# Patient Record
Sex: MALE | Race: ASIAN | NOT HISPANIC OR LATINO | URBAN - METROPOLITAN AREA
[De-identification: names, ages, dates, MRNs, and addresses within clinical notes are randomized per-mention and may not be internally consistent; named-entity substitution may affect disease eponyms.]

---

## 2017-06-22 ENCOUNTER — INPATIENT (INPATIENT)
Facility: HOSPITAL | Age: 77
LOS: 2 days | Discharge: HOME | DRG: 663 | End: 2017-06-25
Attending: HOSPITALIST | Admitting: HOSPITALIST
Payer: MEDICARE

## 2017-06-22 VITALS
RESPIRATION RATE: 20 BRPM | OXYGEN SATURATION: 96 % | TEMPERATURE: 98 F | DIASTOLIC BLOOD PRESSURE: 123 MMHG | SYSTOLIC BLOOD PRESSURE: 188 MMHG | HEART RATE: 137 BPM

## 2017-06-22 DIAGNOSIS — Z98.52 VASECTOMY STATUS: Chronic | ICD-10-CM

## 2017-06-22 LAB
ALBUMIN SERPL ELPH-MCNC: 3.6 G/DL — SIGNIFICANT CHANGE UP (ref 3.3–5)
ALP SERPL-CCNC: 114 U/L — SIGNIFICANT CHANGE UP (ref 40–120)
ALT FLD-CCNC: 22 U/L — SIGNIFICANT CHANGE UP (ref 12–78)
ANION GAP SERPL CALC-SCNC: 10 MMOL/L — SIGNIFICANT CHANGE UP (ref 5–17)
APPEARANCE UR: ABNORMAL
APTT BLD: 30.1 SEC — SIGNIFICANT CHANGE UP (ref 27.5–37.4)
AST SERPL-CCNC: 24 U/L — SIGNIFICANT CHANGE UP (ref 15–37)
BASOPHILS # BLD AUTO: 0.1 K/UL — SIGNIFICANT CHANGE UP (ref 0–0.2)
BASOPHILS NFR BLD AUTO: 1.2 % — SIGNIFICANT CHANGE UP (ref 0–2)
BILIRUB SERPL-MCNC: 0.4 MG/DL — SIGNIFICANT CHANGE UP (ref 0.2–1.2)
BILIRUB UR-MCNC: NEGATIVE — SIGNIFICANT CHANGE UP
BUN SERPL-MCNC: 33 MG/DL — HIGH (ref 7–23)
CALCIUM SERPL-MCNC: 8.5 MG/DL — SIGNIFICANT CHANGE UP (ref 8.5–10.1)
CHLORIDE SERPL-SCNC: 108 MMOL/L — SIGNIFICANT CHANGE UP (ref 96–108)
CO2 SERPL-SCNC: 23 MMOL/L — SIGNIFICANT CHANGE UP (ref 22–31)
COLOR SPEC: ABNORMAL
CREAT SERPL-MCNC: 1.1 MG/DL — SIGNIFICANT CHANGE UP (ref 0.5–1.3)
DIFF PNL FLD: ABNORMAL
EOSINOPHIL # BLD AUTO: 0.2 K/UL — SIGNIFICANT CHANGE UP (ref 0–0.5)
EOSINOPHIL NFR BLD AUTO: 2.6 % — SIGNIFICANT CHANGE UP (ref 0–6)
GLUCOSE SERPL-MCNC: 190 MG/DL — HIGH (ref 70–99)
GLUCOSE UR QL: NEGATIVE — SIGNIFICANT CHANGE UP
HCT VFR BLD CALC: 44.3 % — SIGNIFICANT CHANGE UP (ref 39–50)
HGB BLD-MCNC: 15.7 G/DL — SIGNIFICANT CHANGE UP (ref 13–17)
INR BLD: 0.92 RATIO — SIGNIFICANT CHANGE UP (ref 0.88–1.16)
KETONES UR-MCNC: ABNORMAL
LEUKOCYTE ESTERASE UR-ACNC: NEGATIVE — SIGNIFICANT CHANGE UP
LYMPHOCYTES # BLD AUTO: 1.9 K/UL — SIGNIFICANT CHANGE UP (ref 1–3.3)
LYMPHOCYTES # BLD AUTO: 21.3 % — SIGNIFICANT CHANGE UP (ref 13–44)
MCHC RBC-ENTMCNC: 33 PG — SIGNIFICANT CHANGE UP (ref 27–34)
MCHC RBC-ENTMCNC: 35.3 GM/DL — SIGNIFICANT CHANGE UP (ref 32–36)
MCV RBC AUTO: 93.3 FL — SIGNIFICANT CHANGE UP (ref 80–100)
MONOCYTES # BLD AUTO: 0.5 K/UL — SIGNIFICANT CHANGE UP (ref 0–0.9)
MONOCYTES NFR BLD AUTO: 6.3 % — SIGNIFICANT CHANGE UP (ref 1–9)
NEUTROPHILS # BLD AUTO: 6 K/UL — SIGNIFICANT CHANGE UP (ref 1.8–7.4)
NEUTROPHILS NFR BLD AUTO: 68.6 % — SIGNIFICANT CHANGE UP (ref 43–77)
NITRITE UR-MCNC: NEGATIVE — SIGNIFICANT CHANGE UP
PH UR: 7 — SIGNIFICANT CHANGE UP (ref 5–8)
PLATELET # BLD AUTO: 314 K/UL — SIGNIFICANT CHANGE UP (ref 150–400)
POTASSIUM SERPL-MCNC: 3.9 MMOL/L — SIGNIFICANT CHANGE UP (ref 3.5–5.3)
POTASSIUM SERPL-SCNC: 3.9 MMOL/L — SIGNIFICANT CHANGE UP (ref 3.5–5.3)
PROT SERPL-MCNC: 7 G/DL — SIGNIFICANT CHANGE UP (ref 6–8.3)
PROT UR-MCNC: 500 MG/DL
PROTHROM AB SERPL-ACNC: 10 SEC — SIGNIFICANT CHANGE UP (ref 9.8–12.7)
RBC # BLD: 4.75 M/UL — SIGNIFICANT CHANGE UP (ref 4.2–5.8)
RBC # FLD: 11.2 % — SIGNIFICANT CHANGE UP (ref 10.3–14.5)
SODIUM SERPL-SCNC: 141 MMOL/L — SIGNIFICANT CHANGE UP (ref 135–145)
SP GR SPEC: 1.01 — SIGNIFICANT CHANGE UP (ref 1.01–1.02)
UROBILINOGEN FLD QL: NEGATIVE — SIGNIFICANT CHANGE UP
WBC # BLD: 8.7 K/UL — SIGNIFICANT CHANGE UP (ref 3.8–10.5)
WBC # FLD AUTO: 8.7 K/UL — SIGNIFICANT CHANGE UP (ref 3.8–10.5)

## 2017-06-22 RX ORDER — SODIUM CHLORIDE 9 MG/ML
1000 INJECTION INTRAMUSCULAR; INTRAVENOUS; SUBCUTANEOUS ONCE
Qty: 0 | Refills: 0 | Status: COMPLETED | OUTPATIENT
Start: 2017-06-22 | End: 2017-06-22

## 2017-06-22 NOTE — ED PROVIDER NOTE - OBJECTIVE STATEMENT
77 yo male s/p vasectomy on 6/5 (patient from [place]) BIB family c/o urinary retention since 3pm, noticed some clots passing.  No fever/chills, +suprapubic fullness.

## 2017-06-22 NOTE — ED ADULT NURSE NOTE - OBJECTIVE STATEMENT
received pt in bed #17a Pt A&O unable to urinate since this afternoon & urinating blood. Erickson placed without difficulty draining blood (350cc) received pt in bed #17a Pt A&O unable to urinate since this afternoon & urinating blood. Erickson placed without difficulty draining blood (350cc) Dr Chance @ bedside Will monitor

## 2017-06-22 NOTE — ED PROVIDER NOTE - CARE PLAN
Principal Discharge DX:	Hematuria Principal Discharge DX:	Hematuria  Secondary Diagnosis:	Urinary retention

## 2017-06-22 NOTE — ED ADULT TRIAGE NOTE - CHIEF COMPLAINT QUOTE
"He can't pee."  pt had vasectomy earlier this month, has had intermittent episodes of blood in urine, today complete blood and pt has not been able to urinate since this afternoon, pt in severe distress, diaphoretic, brought immediately to room 17A

## 2017-06-23 DIAGNOSIS — R73.9 HYPERGLYCEMIA, UNSPECIFIED: ICD-10-CM

## 2017-06-23 DIAGNOSIS — R33.9 RETENTION OF URINE, UNSPECIFIED: ICD-10-CM

## 2017-06-23 DIAGNOSIS — R31.9 HEMATURIA, UNSPECIFIED: ICD-10-CM

## 2017-06-23 DIAGNOSIS — Z29.9 ENCOUNTER FOR PROPHYLACTIC MEASURES, UNSPECIFIED: ICD-10-CM

## 2017-06-23 LAB
ANION GAP SERPL CALC-SCNC: 9 MMOL/L — SIGNIFICANT CHANGE UP (ref 5–17)
BUN SERPL-MCNC: 31 MG/DL — HIGH (ref 7–23)
CALCIUM SERPL-MCNC: 8.4 MG/DL — LOW (ref 8.5–10.1)
CHLORIDE SERPL-SCNC: 110 MMOL/L — HIGH (ref 96–108)
CO2 SERPL-SCNC: 23 MMOL/L — SIGNIFICANT CHANGE UP (ref 22–31)
CREAT SERPL-MCNC: 0.97 MG/DL — SIGNIFICANT CHANGE UP (ref 0.5–1.3)
GLUCOSE SERPL-MCNC: 133 MG/DL — HIGH (ref 70–99)
HBA1C BLD-MCNC: 5.7 % — HIGH (ref 4–5.6)
HCT VFR BLD CALC: 41 % — SIGNIFICANT CHANGE UP (ref 39–50)
HGB BLD-MCNC: 14.5 G/DL — SIGNIFICANT CHANGE UP (ref 13–17)
LYMPHOCYTES # BLD AUTO: 9 % — LOW (ref 13–44)
MCHC RBC-ENTMCNC: 33 PG — SIGNIFICANT CHANGE UP (ref 27–34)
MCHC RBC-ENTMCNC: 35.3 GM/DL — SIGNIFICANT CHANGE UP (ref 32–36)
MCV RBC AUTO: 93.4 FL — SIGNIFICANT CHANGE UP (ref 80–100)
MONOCYTES NFR BLD AUTO: 5 % — SIGNIFICANT CHANGE UP (ref 1–9)
NEUTROPHILS NFR BLD AUTO: 83 % — HIGH (ref 43–77)
PLATELET # BLD AUTO: 299 K/UL — SIGNIFICANT CHANGE UP (ref 150–400)
POTASSIUM SERPL-MCNC: 4 MMOL/L — SIGNIFICANT CHANGE UP (ref 3.5–5.3)
POTASSIUM SERPL-SCNC: 4 MMOL/L — SIGNIFICANT CHANGE UP (ref 3.5–5.3)
RBC # BLD: 4.39 M/UL — SIGNIFICANT CHANGE UP (ref 4.2–5.8)
RBC # FLD: 11.4 % — SIGNIFICANT CHANGE UP (ref 10.3–14.5)
SODIUM SERPL-SCNC: 142 MMOL/L — SIGNIFICANT CHANGE UP (ref 135–145)
WBC # BLD: 14.6 K/UL — HIGH (ref 3.8–10.5)
WBC # FLD AUTO: 14.6 K/UL — HIGH (ref 3.8–10.5)

## 2017-06-23 PROCEDURE — 93010 ELECTROCARDIOGRAM REPORT: CPT

## 2017-06-23 PROCEDURE — 99285 EMERGENCY DEPT VISIT HI MDM: CPT

## 2017-06-23 PROCEDURE — 86077 PHYS BLOOD BANK SERV XMATCH: CPT

## 2017-06-23 PROCEDURE — 74176 CT ABD & PELVIS W/O CONTRAST: CPT | Mod: 26

## 2017-06-23 PROCEDURE — 99223 1ST HOSP IP/OBS HIGH 75: CPT | Mod: AI,GC

## 2017-06-23 PROCEDURE — 12345: CPT | Mod: GC,NC

## 2017-06-23 PROCEDURE — 71010: CPT | Mod: 26

## 2017-06-23 RX ORDER — CEFTRIAXONE 500 MG/1
INJECTION, POWDER, FOR SOLUTION INTRAMUSCULAR; INTRAVENOUS
Qty: 0 | Refills: 0 | Status: DISCONTINUED | OUTPATIENT
Start: 2017-06-23 | End: 2017-06-23

## 2017-06-23 RX ORDER — OXYCODONE HYDROCHLORIDE 5 MG/1
5 TABLET ORAL EVERY 4 HOURS
Qty: 0 | Refills: 0 | Status: DISCONTINUED | OUTPATIENT
Start: 2017-06-23 | End: 2017-06-23

## 2017-06-23 RX ORDER — CEFTRIAXONE 500 MG/1
1 INJECTION, POWDER, FOR SOLUTION INTRAMUSCULAR; INTRAVENOUS ONCE
Qty: 0 | Refills: 0 | Status: COMPLETED | OUTPATIENT
Start: 2017-06-23 | End: 2017-06-23

## 2017-06-23 RX ORDER — ACETAMINOPHEN 500 MG
650 TABLET ORAL EVERY 6 HOURS
Qty: 0 | Refills: 0 | Status: DISCONTINUED | OUTPATIENT
Start: 2017-06-23 | End: 2017-06-25

## 2017-06-23 RX ORDER — CEFTRIAXONE 500 MG/1
1 INJECTION, POWDER, FOR SOLUTION INTRAMUSCULAR; INTRAVENOUS EVERY 24 HOURS
Qty: 0 | Refills: 0 | Status: CANCELLED | OUTPATIENT
Start: 2017-06-24 | End: 2017-06-23

## 2017-06-23 RX ORDER — ACETAMINOPHEN 500 MG
650 TABLET ORAL EVERY 6 HOURS
Qty: 0 | Refills: 0 | Status: DISCONTINUED | OUTPATIENT
Start: 2017-06-23 | End: 2017-06-23

## 2017-06-23 RX ORDER — ONDANSETRON 8 MG/1
4 TABLET, FILM COATED ORAL ONCE
Qty: 0 | Refills: 0 | Status: DISCONTINUED | OUTPATIENT
Start: 2017-06-23 | End: 2017-06-23

## 2017-06-23 RX ORDER — DOCUSATE SODIUM 100 MG
100 CAPSULE ORAL
Qty: 0 | Refills: 0 | Status: DISCONTINUED | OUTPATIENT
Start: 2017-06-23 | End: 2017-06-23

## 2017-06-23 RX ORDER — CEFTRIAXONE 500 MG/1
1 INJECTION, POWDER, FOR SOLUTION INTRAMUSCULAR; INTRAVENOUS EVERY 24 HOURS
Qty: 0 | Refills: 0 | Status: DISCONTINUED | OUTPATIENT
Start: 2017-06-23 | End: 2017-06-25

## 2017-06-23 RX ORDER — SODIUM CHLORIDE 9 MG/ML
1000 INJECTION, SOLUTION INTRAVENOUS
Qty: 0 | Refills: 0 | Status: DISCONTINUED | OUTPATIENT
Start: 2017-06-23 | End: 2017-06-23

## 2017-06-23 RX ORDER — HYDROMORPHONE HYDROCHLORIDE 2 MG/ML
0.5 INJECTION INTRAMUSCULAR; INTRAVENOUS; SUBCUTANEOUS
Qty: 0 | Refills: 0 | Status: DISCONTINUED | OUTPATIENT
Start: 2017-06-23 | End: 2017-06-23

## 2017-06-23 RX ADMIN — Medication 650 MILLIGRAM(S): at 21:52

## 2017-06-23 RX ADMIN — Medication 650 MILLIGRAM(S): at 22:41

## 2017-06-23 RX ADMIN — SODIUM CHLORIDE 1000 MILLILITER(S): 9 INJECTION INTRAMUSCULAR; INTRAVENOUS; SUBCUTANEOUS at 03:15

## 2017-06-23 RX ADMIN — Medication 650 MILLIGRAM(S): at 05:17

## 2017-06-23 RX ADMIN — Medication 650 MILLIGRAM(S): at 06:20

## 2017-06-23 RX ADMIN — CEFTRIAXONE 100 GRAM(S): 500 INJECTION, POWDER, FOR SOLUTION INTRAMUSCULAR; INTRAVENOUS at 07:47

## 2017-06-23 RX ADMIN — Medication 1 TABLET(S): at 13:58

## 2017-06-23 RX ADMIN — SODIUM CHLORIDE 100 MILLILITER(S): 9 INJECTION, SOLUTION INTRAVENOUS at 10:37

## 2017-06-23 NOTE — H&P ADULT - HISTORY OF PRESENT ILLNESS
75 y/o M PMHX recurrent UTI s/p repeat vasectomy (6/5/2017) presents with urinary retention and blood clots x 1 day. Patient states he passed 1 large blood clot yesterday     Admits to chills and sweating.     In the ED, Vitals 77 y/o M PMHX recurrent UTI s/p repeat vasectomy (6/5/2017) presents with urinary retention and blood clots x 1 day. Patient Mandarin speaking. Daughter at bedside to translate. Patient states he passed 1 large blood clot yesterday and around 3pm today started experiencing abdominal pain with distention and unable to urinate. Admits to chills and sweating. Denies fever, SOB, CP, N/V/D, dysuria, syncope, HA, back pain. Patient currently visiting from [place]. Vasectomy was performed to provide UTI ppx.     In the ED, Vitals /123, , T 98.2, sat 96%, RR 20, Repeat /96, HR 92. CBC WNL, BUN 33, Creatinine 1.10, Glucose 190, UA red turbid, trace ketones, protein 500, mode blood, RBC > 500, few bacteria. Erickson catheter placed. Urology called and aware. CT renal stone showed Erickson catheter balloon low-lying within the prostate urethra; recommend advancement or replacement. Large heterogeneous hyperdense clot in the posterior dependent of the bladder. Bladder is distended. No hydroureteronephrosis, obstructing urinary tract calculus, and or nephrolithiasis. Received 1 NS bolus.

## 2017-06-23 NOTE — DISCHARGE NOTE ADULT - MEDICATION SUMMARY - MEDICATIONS TO TAKE
I will START or STAY ON the medications listed below when I get home from the hospital:    Calcium 600+D oral tablet  -- 1 tab(s) by mouth once a day  -- Indication: For supplementation

## 2017-06-23 NOTE — H&P ADULT - PROBLEM SELECTOR PLAN 1
Admit to [facility]  Urology called in the ED - will see patient in am Admit to [facility]  Urology Dr. Walker called in the ED - will see patient in am  F/U urine cx   ren placed  F/U ekg and CXR Admit to [facility]  Urology Dr. Walker called in the ED - will see patient in am  F/U urine cx   ren placed, CBI   F/U ekg and CXR

## 2017-06-23 NOTE — DISCHARGE NOTE ADULT - NS AS ACTIVITY OBS
Driving allowed/Walking-Indoors allowed/Showering allowed/Walking-Outdoors allowed/Bathing allowed/Stairs allowed

## 2017-06-23 NOTE — PROGRESS NOTE ADULT - ASSESSMENT
77 y/o M PMHX recurrent UTI s/p repeat vasectomy (6/5/2017) admitted with urinary retention and hematuria. Plan for cystoscopy ariella VILLATORO. 77 y/o M PMHX recurrent UTI s/p repeat vasectomy (6/5/2017) admitted with urinary retention and hematuria. Plan for cystoscopy today. 77 y/o M PMHX recurrent UTI admitted with urinary retention and hematuria. s/p cystoscopy and evacuation of clots and fulgaration of proatate fossa.

## 2017-06-23 NOTE — DISCHARGE NOTE ADULT - CARE PROVIDER_API CALL
Chase Walker), Urology Surgery  [address] [address]  Phone: [phone]  Fax: [phone]

## 2017-06-23 NOTE — H&P ADULT - PROBLEM SELECTOR PLAN 3
DVT PPX - SCDs  fall risk, ambulate with assistance, OOB with assistance   DIET Dash TLC F/U HbA1C  Diet Dash/TLC

## 2017-06-23 NOTE — DISCHARGE NOTE ADULT - HOSPITAL COURSE
Patient is a 75yo M with history of recurrent UTI presents to ED for hematuria and abd pain admitted to [facility] for urinary retention and hematuria. Erickson was placed and drained bloody urine with clots. CT stone found large hypodense clot but no hydroureteronephrosis, obstructing urinary tract calculus, and or nephrolithiasis. Uro consulted. Pt taken to OR for cystoscopy with fulguration of prostate fossa. Bleeding improved s/p procedure. H/H stable. Pt medically optimized to be discharged home. Patient is a 75yo M with history of recurrent UTI presents to ED for hematuria and abd pain admitted to [facility] for urinary retention and hematuria. Ren was placed and drained bloody urine with clots. CT stone found large hypodense clot but no hydroureteronephrosis, obstructing urinary tract calculus, and or nephrolithiasis. Uro consulted. Pt taken to OR for cystoscopy with fulguration of prostate fossa. Bleeding improved s/p procedure. H/H stable at 12.2/35.8. Pt still had some intermittent pink tinged urine in ren, instructed by urology to maintain ren and follow up in office day after discharge 6/26/17 for further care and management.     Gen jackie: nad, elder gentleman mandarin speaking, wife and daughter at bedside  card: rrr, s1/s2 wnl, no m/r/g  resp: ctabl, no w/r/c  abd: soft, nontender, nondistended bs normoactive, no hsm  extrem: nontender, nonedematous  -ren in place, urine expressed 300cc, some pink tinged clots noted    Time spent: 60 minutes  Discussed discharge with Dr. Walker urology, to be followed up tomorrow as outpt in urology office of Dr. Walker. Patient is a 75yo M with history of recurrent UTI presents to ED for hematuria and abd pain admitted to [facility] for urinary retention and hematuria. Ren was placed and drained bloody urine with clots. CT stone found large hypodense clot but no hydroureteronephrosis, obstructing urinary tract calculus, and or nephrolithiasis. Pt found to have A1c of 5.7 high risk equiv with prediabetes, advised to monitor with pmd and follow up, change diet and exercise as tolerated.  Uro consulted. Pt taken to OR for cystoscopy with fulguration of prostate fossa. Bleeding improved s/p procedure. H/H stable at 12.2/35.8. Pt still had some intermittent pink tinged urine in ren, instructed by urology to maintain ren and follow up in office day after discharge 6/26/17 for further care and management.     Gen jackie: nad, elder gentleman mandarin speaking, wife and daughter at bedside  card: rrr, s1/s2 wnl, no m/r/g  resp: ctabl, no w/r/c  abd: soft, nontender, nondistended bs normoactive, no hsm  extrem: nontender, nonedematous  -ren in place, urine expressed 300cc, some pink tinged clots noted    Time spent: 60 minutes  Discussed discharge with Dr. Walker urology, to be followed up tomorrow as outpt in urology office of Dr. Walker.

## 2017-06-23 NOTE — DISCHARGE NOTE ADULT - CARE PLAN
Principal Discharge DX:	Hematuria  Goal:	Resolutions of Sx  Instructions for follow-up, activity and diet:	Follow up with you PMD within 1-2 weeks.  Secondary Diagnosis:	Urinary retention  Goal:	Resolution of Sx Principal Discharge DX:	Hematuria  Goal:	Resolutions of Sx  Instructions for follow-up, activity and diet:	ren kept in as still some blood tinged urine  drink plenty of water  follow up with Dr. Chase Walker ph# [phone] urology tomorrow 6/26/17 for further care and management  Follow up with you PMD and outpt urologist in [place] upon return  Secondary Diagnosis:	Urinary retention  Goal:	Resolution of Sx  Instructions for follow-up, activity and diet:	maintain ren as still some blood tinged urine and follow up as above Principal Discharge DX:	Hematuria  Goal:	Resolutions of Sx  Instructions for follow-up, activity and diet:	ren kept in as still some blood tinged urine  drink plenty of water  follow up with Dr. Chase Walker ph# [phone] urology tomorrow 6/26/17 for further care and management  Follow up with you PMD and outpt urologist in [place] upon return  Secondary Diagnosis:	Urinary retention  Goal:	Resolution of Sx  Instructions for follow-up, activity and diet:	maintain ren as still some blood tinged urine and follow up as above  Secondary Diagnosis:	Prediabetes  Goal:	improve a1c  Instructions for follow-up, activity and diet:	cont to monitor   healthy diet and exercise as atiya  follow up with pmd in [place] to monitor in 3 months Principal Discharge DX:	Hematuria  Goal:	Resolutions of Sx  Instructions for follow-up, activity and diet:	ren kept in as still some blood tinged urine  drink plenty of water  follow up with Dr. Chase Walker ph# [phone] urology tomorrow 6/26/17 for further care and management  Follow up with you PMD and outpt urologist in [place] upon return  Secondary Diagnosis:	Urinary retention  Goal:	Resolution of Sx  Instructions for follow-up, activity and diet:	maintain ren as still some blood tinged urine and follow up as above  Secondary Diagnosis:	Prediabetes  Goal:	improve a1c  Instructions for follow-up, activity and diet:	cont to monitor   healthy diet and exercise as atiya  follow up with pmd in [place] to monitor in 3 months Principal Discharge DX:	Hematuria  Goal:	Resolutions of Sx  Instructions for follow-up, activity and diet:	ren kept in as still some blood tinged urine  drink plenty of water  follow up with Dr. Chase Walker ph# [phone] urology tomorrow 6/26/17 for further care and management  Follow up with you PMD and outpt urologist in [place] upon return  Secondary Diagnosis:	Urinary retention  Goal:	Resolution of Sx  Instructions for follow-up, activity and diet:	maintain ren as still some blood tinged urine and follow up as above  Secondary Diagnosis:	Prediabetes  Goal:	improve a1c  Instructions for follow-up, activity and diet:	cont to monitor   healthy diet and exercise as atiya  follow up with pmd in [place] to monitor in 3 months

## 2017-06-23 NOTE — CONSULT NOTE ADULT - SUBJECTIVE AND OBJECTIVE BOX
pt has family with him who thinks he had bladder stone removal and possible prostate surgery  family thinks he had vasectomy but no scar or sutures  now in clot retention with gross hematuria. will need cystoscopy and clot removal and fulgaration of bleeding. ct  no stones seen   CHIEF COMPLAINT:  76y Male with chief complaint of          HISTORY OF PRESENT ILLNESS:  77 y/o M PMHX recurrent UTI s/p repeat vasectomy (2017) presents with urinary retention and blood clots x 1 day. Patient Mandarin speaking. Daughter at bedside to translate. Patient states he passed 1 large blood clot yesterday and around 3pm today started experiencing abdominal pain with distention and unable to urinate. Admits to chills and sweating. Denies fever, SOB, CP, N/V/D, dysuria, syncope, HA, back pain. Patient currently visiting from [place]. Vasectomy was performed to provide UTI ppx.     In the ED, Vitals /123, , T 98.2, sat 96%, RR 20, Repeat /96, HR 92. CBC WNL, BUN 33, Creatinine 1.10, Glucose 190, UA red turbid, trace ketones, protein 500, mode blood, RBC > 500, few bacteria. Ren catheter placed. Urology called and aware. CT renal stone showed Ren catheter balloon low-lying within the prostate urethra; recommend advancement or replacement. Large heterogeneous hyperdense clot in the posterior dependent of the bladder. Bladder is distended. No hydroureteronephrosis, obstructing urinary tract calculus, and or nephrolithiasis. Received 1 NS bolus.     *************************************************************************************************  PAST MEDICAL & SURGICAL HISTORY:  UTI (urinary tract infection)  No pertinent past medical history  H/O vasectomy: elena 5th 2017 x 2      MEDICATIONS  (STANDING):  calcium carbonate  625 mG + Vitamin D (OsCal 250 + D) 1Tablet(s) Oral daily  cefTRIAXone   IVPB  IV Intermittent   docusate sodium 100milliGRAM(s) Oral two times a day  cefTRIAXone   IVPB 1Gram(s) IV Intermittent once    MEDICATIONS  (PRN):  acetaminophen   Tablet. 650milliGRAM(s) Oral every 6 hours PRN Mild Pain (1 - 3)  oxyCODONE  5 mG/acetaminophen 325 mG 1Tablet(s) Oral every 6 hours PRN Moderate Pain (4 - 6)      ALLERGIES:  No Known Allergies      SOCIAL HISTORY:          ETOH:                                  Smoking:                                   Drugs:                                         Occupation:    FAMILY HISTORY:  No pertinent family history in first degree relatives      CONSTITUTIONAL: No weakness, fevers or chills    EYES/ENT: No visual changes;  No vertigo or throat pain     NECK: No pain or stiffness    RESPIRATORY: No cough, wheezing, hemoptysis; No shortness of breath    CARDIOVASCULAR: No chest pain or palpitations    GASTROINTESTINAL: No abdominal or epigastric pain. No nausea, vomiting, or hematemesis; No diarrhea or constipation. No melena or hematochezia.    GENITOURINARY: clots with retention    NEUROLOGICAL: No numbness or weakness    SKIN: No itching, burning, rashes, or lesions     All other review of systems is negative unless indicated above.    ****************************************************************************************************  PHYSICAL EXAM:    Vital Signs Last 24 Hrs  T(C): 36.7, Max: 37.1 ( @ 01:27)  T(F): 98, Max: 98.7 ( @ 01:27)  HR: 96 (80 - 137)  BP: 151/95 (137/93 - 188/123)  BP(mean): --  RR: 20 (18 - 22)  SpO2: 98% (96% - 98%)    GENERAL:    PENIS:    TESTES:    PROSTATE/ RECTAL:    ABDOMEN: distended with clots  [address] ren     BACK:    LOWER EXTREMITIES:    NEUROLOGICAL:      *******************************************************************************************************  LABS:                        15.7   8.7   )-----------( 314      ( 2017 23:19 )             44.3         141  |  108  |  33<H>  ----------------------------<  190<H>  3.9   |  23  |  1.10    Ca    8.5      2017 23:19    TPro  7.0  /  Alb  3.6  /  TBili  0.4  /  DBili  x   /  AST  24  /  ALT  22  /  AlkPhos  114      PT/INR - ( 2017 23:19 )   PT: 10.0 sec;   INR: 0.92 ratio         PTT - ( 2017 23:19 )  PTT:30.1 sec  Urinalysis Basic - ( 2017 23:19 )    Color: Red / Appearance: Turbid / S.015 / pH: x  Gluc: x / Ketone: Trace  / Bili: Negative / Urobili: Negative   Blood: x / Protein: 500 mg/dL / Nitrite: Negative   Leuk Esterase: Negative / RBC: >50 /HPF / WBC 0-2   Sq Epi: x / Non Sq Epi: x / Bacteria: Few      Urine Culture:     RADIOLOGY & ADDITIONAL STUDIES:      *********************************************************************************************************  IMPRESSION: clot retention    PLAN: cystoscopy

## 2017-06-23 NOTE — DISCHARGE NOTE ADULT - ADDITIONAL INSTRUCTIONS
Follow up with your PMD in 1-2 weeks. Follow up with Dr. Chase Walker ph#:[phone] within 1-2 days of discharge, preferably tomorrow for followup and further care  Follow up with your outpatient primary medical provider and urologist in [place] upon return.  if ren demonstrates bloody urine or concerns please call Dr. Walker urology asap or if unsuccessful attempts with urology go to ED for further eval

## 2017-06-23 NOTE — PROGRESS NOTE ADULT - SUBJECTIVE AND OBJECTIVE BOX
CHIEF COMPLAINT/INTERVAL HISTORY:    REVIEW OF SYSTEMS:     Vital Signs Last 24 Hrs  T(C): 36.6, Max: 37.1 ( @ 01:27)  T(F): 97.8, Max: 98.7 ( @ 01:27)  HR: 97 (80 - 137)  BP: 120/78 (120/78 - 188/123)  BP(mean): --  RR: 20 (18 - 22)  SpO2: 96% (96% - 98%)    PHYSICAL EXAM:  GENERAL: NAD  HEENT: EOMI, hearing normal, conjunctiva and sclera clear  Chest: CTA bilaterally, no wheezing  CV: S1S2, RRR,   GI: soft, +BS, NT/ND  Musculoskeletal: no edema  Psychiatric: affect nL, mood nL  Skin: warm and dry      I & Os for current day (as of  @ 08:26)  =============================================  IN: 0 ml / OUT: 8450 ml / NET: -8450 ml      LABS:                        14.5   14.6  )-----------( 299      ( 2017 07:15 )             41.0         142  |  110<H>  |  31<H>  ----------------------------<  133<H>  4.0   |  23  |  0.97    Ca    8.4<L>      2017 07:15    TPro  7.0  /  Alb  3.6  /  TBili  0.4  /  DBili  x   /  AST  24  /  ALT  22  /  AlkPhos  114  06-    PT/INR - ( 2017 23:19 )   PT: 10.0 sec;   INR: 0.92 ratio         PTT - ( 2017 23:19 )  PTT:30.1 sec  Urinalysis Basic - ( 2017 23:19 )    Color: Red / Appearance: Turbid / S.015 / pH: x  Gluc: x / Ketone: Trace  / Bili: Negative / Urobili: Negative   Blood: x / Protein: 500 mg/dL / Nitrite: Negative   Leuk Esterase: Negative / RBC: >50 /HPF / WBC 0-2   Sq Epi: x / Non Sq Epi: x / Bacteria: Few            RADIOLOGY:     EXAM:  CT RENAL STONE HUNT                        PROCEDURE DATE:  2017        INTERPRETATION:  CT OF THE ABDOMEN AND PELVIS DATED 2017.    CLINICAL INFORMATION:  Gross hematuria.    TECHNIQUE: Axial CT images are obtained from the lung bases through the   pubic symphysis without the administration of oral or intravenous   contrast. Images are reformatted in the sagittal and coronal planes.    No prior studies are available for comparison.    FINDINGS:    There is mild bibasilar dependent atelectasis. There is a 3 mm nodule in   the right middle lobe (series 2:7). There is a 5 mm subpleural nodule in   the left lower lobe (series 2:23).    The unenhanced liver, gallbladder, biliary tree, pancreas, spleen, and   adrenal glands are unremarkable. The kidneys are symmetric in size. There   is no hydroureteronephrosis or obstructing urinary tract calculus. There   is no nephrolithiasis. There is no asymmetric perinephric stranding. The   urinary bladder is distended with alarge heterogeneous hyperdense clot   in the posterior dependent portion of the bladder. The Erickson catheter   balloon is low lying and within the prostate urethra. The prostate gland   is mildly enlarged.    There is no bowel obstruction or overt bowel wall thickening. The   appendix is not identified. There is no pneumoperitoneum, ascites, or   loculated collection.    There is no significant abdominal or pelvic lymphadenopathy. There is   mild after scrota calcification of the aortoiliac tree. The abdominal   aorta is normal in caliber.    There are fat-containing bilateral inguinal hernias. There is a high   riding left testicle.    The osseous structures are unremarkable apart from degenerative changes   of the spine.    IMPRESSION:    Erickson catheter balloon low-lying within the prostate urethra; recommend   advancement or replacement. Large heterogeneous hyperdense clot in the   posterior dependent of the bladder. Bladder is distended. No   hydroureteronephrosis, obstructing urinary tract calculus, and or   nephrolithiasis. Findings were discussed with Dr. Filiberto Chance of the   emergency Department at 1:30 AM on 2017..            MEDICATIONS  (STANDING):  calcium carbonate  625 mG + Vitamin D (OsCal 250 + D) 1Tablet(s) Oral daily  cefTRIAXone   IVPB  IV Intermittent   docusate sodium 100milliGRAM(s) Oral two times a day    MEDICATIONS  (PRN):  acetaminophen   Tablet. 650milliGRAM(s) Oral every 6 hours PRN Mild Pain (1 - 3)  oxyCODONE  5 mG/acetaminophen 325 mG 1Tablet(s) Oral every 6 hours PRN Moderate Pain (4 - 6)  f CHIEF COMPLAINT/INTERVAL HISTORY: Patient seen and examined at bedside. Pt feels well s/p cystoscopy and evacuation of clots and fulgaration of proatate fossa. Ren in place and draining pink urine. No complaints at this time.     REVIEW OF SYSTEMS: Denies CP, SOB, abd pain, urinary complaints.     Vital Signs Last 24 Hrs  T(C): 36.6, Max: 37.1 ( @ 01:27)  T(F): 97.8, Max: 98.7 ( @ 01:27)  HR: 97 (80 - 137)  BP: 120/78 (120/78 - 188/123)  BP(mean): --  RR: 20 (18 - 22)  SpO2: 96% (96% - 98%)    PHYSICAL EXAM:  GENERAL: NAD, laying comfortably in bed.   HEENT: EOMI, hearing normal, conjunctiva and sclera clear  Chest: CTA bilaterally, no wheezing, rhonchi, rales   CV: S1S2, RRR with no MRG  GI: soft, +BS, NT/ND  Musculoskeletal: no edema  : ren in place and draining pink urine   Psychiatric: affect nL, mood nL  Skin: warm and dry      I & Os for current day (as of  @ 08:26)  =============================================  IN: 0 ml / OUT: 8450 ml / NET: -8450 ml      LABS:                        14.5   14.6  )-----------( 299      ( 2017 07:15 )             41.0         142  |  110<H>  |  31<H>  ----------------------------<  133<H>  4.0   |  23  |  0.97    Ca    8.4<L>      2017 07:15    TPro  7.0  /  Alb  3.6  /  TBili  0.4  /  DBili  x   /  AST  24  /  ALT  22  /  AlkPhos  114      PT/INR - ( 2017 23:19 )   PT: 10.0 sec;   INR: 0.92 ratio         PTT - ( 2017 23:19 )  PTT:30.1 sec  Urinalysis Basic - ( 2017 23:19 )    Color: Red / Appearance: Turbid / S.015 / pH: x  Gluc: x / Ketone: Trace  / Bili: Negative / Urobili: Negative   Blood: x / Protein: 500 mg/dL / Nitrite: Negative   Leuk Esterase: Negative / RBC: >50 /HPF / WBC 0-2   Sq Epi: x / Non Sq Epi: x / Bacteria: Few            RADIOLOGY:     EXAM:  CT RENAL STONE HUNT                        PROCEDURE DATE:  2017        INTERPRETATION:  CT OF THE ABDOMEN AND PELVIS DATED 2017.    CLINICAL INFORMATION:  Gross hematuria.    TECHNIQUE: Axial CT images are obtained from the lung bases through the   pubic symphysis without the administration of oral or intravenous   contrast. Images are reformatted in the sagittal and coronal planes.    No prior studies are available for comparison.    FINDINGS:    There is mild bibasilar dependent atelectasis. There is a 3 mm nodule in   the right middle lobe (series 2:7). There is a 5 mm subpleural nodule in   the left lower lobe (series 2:23).    The unenhanced liver, gallbladder, biliary tree, pancreas, spleen, and   adrenal glands are unremarkable. The kidneys are symmetric in size. There   is no hydroureteronephrosis or obstructing urinary tract calculus. There   is no nephrolithiasis. There is no asymmetric perinephric stranding. The   urinary bladder is distended with alarge heterogeneous hyperdense clot   in the posterior dependent portion of the bladder. The Ren catheter   balloon is low lying and within the prostate urethra. The prostate gland   is mildly enlarged.    There is no bowel obstruction or overt bowel wall thickening. The   appendix is not identified. There is no pneumoperitoneum, ascites, or   loculated collection.    There is no significant abdominal or pelvic lymphadenopathy. There is   mild after scrota calcification of the aortoiliac tree. The abdominal   aorta is normal in caliber.    There are fat-containing bilateral inguinal hernias. There is a high   riding left testicle.    The osseous structures are unremarkable apart from degenerative changes   of the spine.    IMPRESSION:    Ren catheter balloon low-lying within the prostate urethra; recommend   advancement or replacement. Large heterogeneous hyperdense clot in the   posterior dependent of the bladder. Bladder is distended. No   hydroureteronephrosis, obstructing urinary tract calculus, and or   nephrolithiasis. Findings were discussed with Dr. Filiberto Chance of the   emergency Department at 1:30 AM on 2017..          MEDICATIONS  (STANDING):  cefTRIAXone   IVPB 1Gram(s) IV Intermittent every 24 hours  calcium carbonate  625 mG + Vitamin D (OsCal 250 + D) 1Tablet(s) Oral daily    MEDICATIONS  (PRN):  acetaminophen   Tablet. 650milliGRAM(s) Oral every 6 hours PRN Mild Pain (1 - 3)

## 2017-06-23 NOTE — H&P ADULT - NEGATIVE MUSCULOSKELETAL SYMPTOMS
no arthralgia/no stiffness/no arthritis/no joint swelling/no myalgia/no muscle cramps/no muscle weakness

## 2017-06-23 NOTE — PROGRESS NOTE ADULT - PROBLEM SELECTOR PLAN 1
Plan for cysto tomm AM. Erickson in place.   - continue with rocephin   - f/u urine cx   - urology consulted (Dr. Walker) Plan for cysto today. Erickson in place.   - continue with rocephin   - f/u urine cx   - urology consulted (Dr. Walker) s/p cystoscopy and fulgaration of prostate fossa.   - continue with rocephin   - f/u urine cx   - urology consulted (Dr. Walker)

## 2017-06-23 NOTE — DISCHARGE NOTE ADULT - PLAN OF CARE
Resolutions of Sx Follow up with you PMD within 1-2 weeks. Resolution of Sx maintain ren as still some blood tinged urine and follow up as above ren kept in as still some blood tinged urine  drink plenty of water  follow up with Dr. Chase Walker ph# [phone] urology tomorrow 6/26/17 for further care and management  Follow up with you PMD and outpt urologist in [place] upon return improve a1c cont to monitor   healthy diet and exercise as atiya  follow up with pmd in [place] to monitor in 3 months

## 2017-06-23 NOTE — H&P ADULT - ASSESSMENT
75 y/o M PMHX recurrent UTI s/p repeat vasectomy (6/5/2017) admitted with 77 y/o M PMHX recurrent UTI s/p repeat vasectomy (6/5/2017) admitted with urinary retention and hematuria.

## 2017-06-23 NOTE — DISCHARGE NOTE ADULT - PATIENT PORTAL LINK FT
“You can access the [facility] Patient Portal, offered by [facility], by registering with the following website: [email or web address]”

## 2017-06-24 LAB
ANION GAP SERPL CALC-SCNC: 8 MMOL/L — SIGNIFICANT CHANGE UP (ref 5–17)
BUN SERPL-MCNC: 19 MG/DL — SIGNIFICANT CHANGE UP (ref 7–23)
CALCIUM SERPL-MCNC: 8.8 MG/DL — SIGNIFICANT CHANGE UP (ref 8.5–10.1)
CHLORIDE SERPL-SCNC: 106 MMOL/L — SIGNIFICANT CHANGE UP (ref 96–108)
CO2 SERPL-SCNC: 25 MMOL/L — SIGNIFICANT CHANGE UP (ref 22–31)
CREAT SERPL-MCNC: 0.81 MG/DL — SIGNIFICANT CHANGE UP (ref 0.5–1.3)
CULTURE RESULTS: NO GROWTH — SIGNIFICANT CHANGE UP
GLUCOSE SERPL-MCNC: 120 MG/DL — HIGH (ref 70–99)
HCT VFR BLD CALC: 34.4 % — LOW (ref 39–50)
HGB BLD-MCNC: 11.9 G/DL — LOW (ref 13–17)
MCHC RBC-ENTMCNC: 32.7 PG — SIGNIFICANT CHANGE UP (ref 27–34)
MCHC RBC-ENTMCNC: 34.5 GM/DL — SIGNIFICANT CHANGE UP (ref 32–36)
MCV RBC AUTO: 94.6 FL — SIGNIFICANT CHANGE UP (ref 80–100)
PLATELET # BLD AUTO: 255 K/UL — SIGNIFICANT CHANGE UP (ref 150–400)
POTASSIUM SERPL-MCNC: 3.7 MMOL/L — SIGNIFICANT CHANGE UP (ref 3.5–5.3)
POTASSIUM SERPL-SCNC: 3.7 MMOL/L — SIGNIFICANT CHANGE UP (ref 3.5–5.3)
RBC # BLD: 3.63 M/UL — LOW (ref 4.2–5.8)
RBC # FLD: 11.4 % — SIGNIFICANT CHANGE UP (ref 10.3–14.5)
SODIUM SERPL-SCNC: 139 MMOL/L — SIGNIFICANT CHANGE UP (ref 135–145)
SPECIMEN SOURCE: SIGNIFICANT CHANGE UP
WBC # BLD: 12.4 K/UL — HIGH (ref 3.8–10.5)
WBC # FLD AUTO: 12.4 K/UL — HIGH (ref 3.8–10.5)

## 2017-06-24 PROCEDURE — 99233 SBSQ HOSP IP/OBS HIGH 50: CPT

## 2017-06-24 RX ADMIN — Medication 1 TABLET(S): at 11:14

## 2017-06-24 RX ADMIN — CEFTRIAXONE 100 GRAM(S): 500 INJECTION, POWDER, FOR SOLUTION INTRAMUSCULAR; INTRAVENOUS at 06:27

## 2017-06-24 NOTE — PROGRESS NOTE ADULT - ATTENDING COMMENTS
Pt. seen and evaluated for hematuria and urinary retention.  Pt. is s/p cystoscopy with evacuation of clots and fulguration of prostate fossa.  Pt. is in no distress.  Mild blood tinged urine in ren bag.  Currently on CBI.  Physical examination significant for ren catheter with slight blood tinged urine.  Abd: soft +BS NT/ND.    assessment and plan:  -Hematuria and urinary retention: s/p cystoscopy.  Maintain ren catheter and continue CBI.  Continue Ceftriaxone.  Urology f/u  -VTE ppx: SCD
likely dc tomorrow

## 2017-06-24 NOTE — PROGRESS NOTE ADULT - PROBLEM SELECTOR PLAN 1
acute sec to TURP and retained clots  s/p cystoscopy and fulgaration of prostate fossa.   - continue with rocephin for empiric coverage of instrumentation, dc tomorrow   - f/u urine cx   - urology consult Dr. Walker apprec, plan CBI stopped today, may dc ren tomorrow if no gross hematuria and able to void post ren removal in AM sunday 6/25, if hematuria + keep ren in or unable to void; may dc home as long as medically stable with follow up within 1 week with uro regardless

## 2017-06-24 NOTE — PROGRESS NOTE ADULT - ASSESSMENT
75 y/o M PMHX recurrent UTI admitted with urinary retention and hematuria. s/p cystoscopy and evacuation of clots and fulgaration of proatate fossa.

## 2017-06-24 NOTE — PROGRESS NOTE ADULT - SUBJECTIVE AND OBJECTIVE BOX
Patient is a 76y old  Male who presents with a chief complaint of BLOOD IN URINE (2017 15:52)      INTERVAL HPI: Pt seen and examined for hematuria sec clot retention as sequelae from TURP 2 week pta. Pt's daughter at bedside helps with history and exam in patients native language. No current acute complaints at this time. Pt CBI stopped today and urine now back to normal color with ren in place.   OVERNIGHT EVENTS: None noted.   T(F): 97.5, Max: 98.3 (-23 @ 15:20)  HR: 57 (57 - 76)  BP: 122/67 (99/65 - 125/63)  RR: 17 (16 - 18)  SpO2: 98% (95% - 98%)  Wt(kg): --  I&O's Summary  I & Os for 24h ending 2017 07:00  =============================================  IN: 09654 ml / OUT: 44113 ml / NET: 1220 ml    I & Os for current day (as of 2017 12:10)  =============================================  IN: 0 ml / OUT: 4000 ml / NET: -4000 ml      REVIEW OF SYSTEMS:  CONSTITUTIONAL: No fever, weight loss, or fatigue  EYES: No eye pain, visual disturbances, or discharge  ENMT:  No difficulty hearing, tinnitus, vertigo; No sinus or throat pain  NECK: No pain or stiffness  RESPIRATORY: No cough, wheezing, chills or hemoptysis; No shortness of breath  CARDIOVASCULAR: No chest pain, palpitations, dizziness, or leg swelling  GASTROINTESTINAL: No abdominal or epigastric pain. No nausea, vomiting, or hematemesis; No diarrhea or constipation. No melena or hematochezia.  GENITOURINARY: No dysuria, frequency, hematuria, or incontinence  NEUROLOGICAL: No headaches, memory loss, loss of strength, numbness, or tremors  SKIN: No itching, burning, rashes, or lesions   LYMPH NODES: No enlarged glands  ENDOCRINE: No heat or cold intolerance; No hair loss  MUSCULOSKELETAL: No joint pain or swelling; No muscle, back, or extremity pain  PSYCHIATRIC: No depression, anxiety, mood swings, or difficulty sleeping  HEME/LYMPH: No easy bruising, or bleeding gums  ALLERY AND IMMUNOLOGIC: No hives or eczema    PHYSICAL EXAM:  GENERAL: NAD, well-groomed, well-developed  HEAD:  Atraumatic, Normocephalic  EYES: EOMI, PERRLA, conjunctiva and sclera clear  ENMT: No tonsillar erythema, exudates, or enlargement; Moist mucous membranes, Good dentition, No lesions  NECK: Supple, No JVD, Normal thyroid  NERVOUS SYSTEM:  Alert & Oriented X3, Good concentration; Motor Strength 5/5 B/L upper and lower extremities; DTRs 2+ intact and symmetric  CHEST/LUNG: Clear to auscultation bilaterally; No rales, rhonchi, wheezing, or rubs  HEART: Regular rate and rhythm; No murmurs, rubs, or gallops  ABDOMEN/: Soft, Nontender, Nondistended; Bowel sounds present, ren in place with yellow urine in bag.   EXTREMITIES:  2+ Peripheral Pulses, No clubbing, cyanosis, or edema  LYMPH: No lymphadenopathy noted  SKIN: No rashes or lesions    LABS:                        11.9   12.4  )-----------( 255      ( 2017 06:43 )             34.4     -    139  |  106  |  19  ----------------------------<  120<H>  3.7   |  25  |  0.81    Ca    8.8      2017 06:43    TPro  7.0  /  Alb  3.6  /  TBili  0.4  /  DBili  x   /  AST  24  /  ALT  22  /  AlkPhos  114  06-    PT/INR - ( 2017 23:19 )   PT: 10.0 sec;   INR: 0.92 ratio         PTT - ( 2017 23:19 )  PTT:30.1 sec  Urinalysis Basic - ( 2017 23:19 )    Color: Red / Appearance: Turbid / S.015 / pH: x  Gluc: x / Ketone: Trace  / Bili: Negative / Urobili: Negative   Blood: x / Protein: 500 mg/dL / Nitrite: Negative   Leuk Esterase: Negative / RBC: >50 /HPF / WBC 0-2   Sq Epi: x / Non Sq Epi: x / Bacteria: Few      CAPILLARY BLOOD GLUCOSE              MEDICATIONS  (STANDING):  cefTRIAXone   IVPB 1Gram(s) IV Intermittent every 24 hours  calcium carbonate  625 mG + Vitamin D (OsCal 250 + D) 1Tablet(s) Oral daily    MEDICATIONS  (PRN):  acetaminophen   Tablet. 650milliGRAM(s) Oral every 6 hours PRN Mild Pain (1 - 3)

## 2017-06-24 NOTE — PROGRESS NOTE ADULT - SUBJECTIVE AND OBJECTIVE BOX
DEPARTMENT OF ANESTHESIA  POST ANESTHETIC EVALUATION    The Patient was interviewed and evaluated    Vital Signs Last 24 Hrs  T(C): 36.4, Max: 36.8 (06-23 @ 15:20)  T(F): 97.5, Max: 98.3 (06-23 @ 15:20)  HR: 57 (57 - 77)  BP: 122/67 (99/65 - 142/85)  BP(mean): --  RR: 17 (12 - 18)  SpO2: 98% (95% - 99%)    Evaluation:  Stable post cysto    (x ) No apparent complications or complaints regarding anesthesia care at this time  (x ) All questions were answered    Condition:  (x ) Stable      ( ) Guarded      ( ) Critical    Recommendations:  (x ) None     ( ) Other:

## 2017-06-24 NOTE — PROGRESS NOTE ADULT - SUBJECTIVE AND OBJECTIVE BOX
CHIEF COMPLAINT/ PRESENT FINDINGS: s/p evacuation clots and fulgaration of prostae fossa   findings of tur prostate about two weeks ago and had a bleed that resuted in clot retention. hct 34           ****************************************************************************************************  PHYSICAL EXAM:    Vital Signs Last 24 Hrs  T(C): 36.4, Max: 36.8 ( @ 15:20)  T(F): 97.5, Max: 98.3 ( @ 15:20)  HR: 57 (57 - 77)  BP: 122/67 (99/65 - 142/85)  BP(mean): --  RR: 17 (12 - 18)  SpO2: 98% (95% - 99%)    GENERAL: alert     PENIS: has ren draining clear     TESTES:    PROSTATE/ RECTAL:    ABDOMEN: soft     BACK:    LOWER EXTREMITIES:    NEUROLOGICAL:    **********************************************************************************************************  LABS:                        11.9   12.4  )-----------( 255      ( 2017 06:43 )             34.4     -    139  |  106  |  19  ----------------------------<  120<H>  3.7   |  25  |  0.81    Ca    8.8      2017 06:43    TPro  7.0  /  Alb  3.6  /  TBili  0.4  /  DBili  x   /  AST  24  /  ALT  22  /  AlkPhos  114  06-22    PT/INR - ( 2017 23:19 )   PT: 10.0 sec;   INR: 0.92 ratio         PTT - ( 2017 23:19 )  PTT:30.1 sec  Urinalysis Basic - ( 2017 23:19 )    Color: Red / Appearance: Turbid / S.015 / pH: x  Gluc: x / Ketone: Trace  / Bili: Negative / Urobili: Negative   Blood: x / Protein: 500 mg/dL / Nitrite: Negative   Leuk Esterase: Negative / RBC: >50 /HPF / WBC 0-2   Sq Epi: x / Non Sq Epi: x / Bacteria: Few      Urine Culture:     RADIOLOGY & ADDITIONAL STUDIES:      IMPRESSION: doing well  no bleeding in cbi tubing     PLAN: stop cbi  remove ren on  am if urine clear

## 2017-06-25 VITALS
RESPIRATION RATE: 17 BRPM | HEART RATE: 84 BPM | OXYGEN SATURATION: 98 % | DIASTOLIC BLOOD PRESSURE: 70 MMHG | TEMPERATURE: 98 F | SYSTOLIC BLOOD PRESSURE: 125 MMHG

## 2017-06-25 LAB
ANION GAP SERPL CALC-SCNC: 6 MMOL/L — SIGNIFICANT CHANGE UP (ref 5–17)
BUN SERPL-MCNC: 13 MG/DL — SIGNIFICANT CHANGE UP (ref 7–23)
CALCIUM SERPL-MCNC: 8.6 MG/DL — SIGNIFICANT CHANGE UP (ref 8.5–10.1)
CHLORIDE SERPL-SCNC: 106 MMOL/L — SIGNIFICANT CHANGE UP (ref 96–108)
CO2 SERPL-SCNC: 29 MMOL/L — SIGNIFICANT CHANGE UP (ref 22–31)
CREAT SERPL-MCNC: 0.88 MG/DL — SIGNIFICANT CHANGE UP (ref 0.5–1.3)
GLUCOSE SERPL-MCNC: 104 MG/DL — HIGH (ref 70–99)
HCT VFR BLD CALC: 35.8 % — LOW (ref 39–50)
HGB BLD-MCNC: 12.2 G/DL — LOW (ref 13–17)
MCHC RBC-ENTMCNC: 32.5 PG — SIGNIFICANT CHANGE UP (ref 27–34)
MCHC RBC-ENTMCNC: 34.3 GM/DL — SIGNIFICANT CHANGE UP (ref 32–36)
MCV RBC AUTO: 94.8 FL — SIGNIFICANT CHANGE UP (ref 80–100)
PLATELET # BLD AUTO: 252 K/UL — SIGNIFICANT CHANGE UP (ref 150–400)
POTASSIUM SERPL-MCNC: 3.6 MMOL/L — SIGNIFICANT CHANGE UP (ref 3.5–5.3)
POTASSIUM SERPL-SCNC: 3.6 MMOL/L — SIGNIFICANT CHANGE UP (ref 3.5–5.3)
RBC # BLD: 3.77 M/UL — LOW (ref 4.2–5.8)
RBC # FLD: 11.1 % — SIGNIFICANT CHANGE UP (ref 10.3–14.5)
SODIUM SERPL-SCNC: 141 MMOL/L — SIGNIFICANT CHANGE UP (ref 135–145)
WBC # BLD: 8.5 K/UL — SIGNIFICANT CHANGE UP (ref 3.8–10.5)
WBC # FLD AUTO: 8.5 K/UL — SIGNIFICANT CHANGE UP (ref 3.8–10.5)

## 2017-06-25 PROCEDURE — 87086 URINE CULTURE/COLONY COUNT: CPT

## 2017-06-25 PROCEDURE — 80048 BASIC METABOLIC PNL TOTAL CA: CPT

## 2017-06-25 PROCEDURE — 99239 HOSP IP/OBS DSCHRG MGMT >30: CPT

## 2017-06-25 PROCEDURE — 86880 COOMBS TEST DIRECT: CPT

## 2017-06-25 PROCEDURE — 86870 RBC ANTIBODY IDENTIFICATION: CPT

## 2017-06-25 PROCEDURE — 74176 CT ABD & PELVIS W/O CONTRAST: CPT

## 2017-06-25 PROCEDURE — 86850 RBC ANTIBODY SCREEN: CPT

## 2017-06-25 PROCEDURE — 96365 THER/PROPH/DIAG IV INF INIT: CPT

## 2017-06-25 PROCEDURE — 93005 ELECTROCARDIOGRAM TRACING: CPT

## 2017-06-25 PROCEDURE — 81001 URINALYSIS AUTO W/SCOPE: CPT

## 2017-06-25 PROCEDURE — 99285 EMERGENCY DEPT VISIT HI MDM: CPT | Mod: 25

## 2017-06-25 PROCEDURE — 85027 COMPLETE CBC AUTOMATED: CPT

## 2017-06-25 PROCEDURE — 86901 BLOOD TYPING SEROLOGIC RH(D): CPT

## 2017-06-25 PROCEDURE — 85610 PROTHROMBIN TIME: CPT

## 2017-06-25 PROCEDURE — 83036 HEMOGLOBIN GLYCOSYLATED A1C: CPT

## 2017-06-25 PROCEDURE — 86900 BLOOD TYPING SEROLOGIC ABO: CPT

## 2017-06-25 PROCEDURE — 36415 COLL VENOUS BLD VENIPUNCTURE: CPT

## 2017-06-25 PROCEDURE — 71045 X-RAY EXAM CHEST 1 VIEW: CPT

## 2017-06-25 PROCEDURE — 86905 BLOOD TYPING RBC ANTIGENS: CPT

## 2017-06-25 PROCEDURE — 85730 THROMBOPLASTIN TIME PARTIAL: CPT

## 2017-06-25 PROCEDURE — 80053 COMPREHEN METABOLIC PANEL: CPT

## 2017-06-25 RX ADMIN — CEFTRIAXONE 100 GRAM(S): 500 INJECTION, POWDER, FOR SOLUTION INTRAMUSCULAR; INTRAVENOUS at 05:08

## 2017-06-25 RX ADMIN — Medication 1 TABLET(S): at 11:23

## 2017-06-28 ENCOUNTER — EMERGENCY (EMERGENCY)
Facility: HOSPITAL | Age: 77
LOS: 1 days | Discharge: HOME | End: 2017-06-28
Attending: EMERGENCY MEDICINE | Admitting: EMERGENCY MEDICINE
Payer: MEDICARE

## 2017-06-28 VITALS
DIASTOLIC BLOOD PRESSURE: 79 MMHG | WEIGHT: 160.06 LBS | SYSTOLIC BLOOD PRESSURE: 133 MMHG | HEART RATE: 74 BPM | OXYGEN SATURATION: 95 % | RESPIRATION RATE: 16 BRPM | TEMPERATURE: 98 F

## 2017-06-28 VITALS
RESPIRATION RATE: 15 BRPM | HEART RATE: 63 BPM | DIASTOLIC BLOOD PRESSURE: 78 MMHG | TEMPERATURE: 97 F | SYSTOLIC BLOOD PRESSURE: 140 MMHG | OXYGEN SATURATION: 99 %

## 2017-06-28 DIAGNOSIS — Z87.891 PERSONAL HISTORY OF NICOTINE DEPENDENCE: ICD-10-CM

## 2017-06-28 DIAGNOSIS — Z90.79 ACQUIRED ABSENCE OF OTHER GENITAL ORGAN(S): Chronic | ICD-10-CM

## 2017-06-28 DIAGNOSIS — R33.9 RETENTION OF URINE, UNSPECIFIED: ICD-10-CM

## 2017-06-28 DIAGNOSIS — Z98.52 VASECTOMY STATUS: ICD-10-CM

## 2017-06-28 DIAGNOSIS — R73.03 PREDIABETES: ICD-10-CM

## 2017-06-28 DIAGNOSIS — Z98.52 VASECTOMY STATUS: Chronic | ICD-10-CM

## 2017-06-28 DIAGNOSIS — R73.9 HYPERGLYCEMIA, UNSPECIFIED: ICD-10-CM

## 2017-06-28 DIAGNOSIS — R31.9 HEMATURIA, UNSPECIFIED: ICD-10-CM

## 2017-06-28 PROCEDURE — 99284 EMERGENCY DEPT VISIT MOD MDM: CPT

## 2017-06-28 PROCEDURE — 99284 EMERGENCY DEPT VISIT MOD MDM: CPT | Mod: 25

## 2017-06-28 PROCEDURE — 51702 INSERT TEMP BLADDER CATH: CPT

## 2017-06-28 NOTE — ED ADULT NURSE NOTE - NS ED NURSE DC INFO COMPLEXITY
Verbalized Understanding/Patient asked questions/Straightforward: Basic instructions, no meds, no home treatment

## 2017-06-28 NOTE — ED PROVIDER NOTE - CONSTITUTIONAL, MLM
normal... Well appearing,  male, well nourished, awake, alert, oriented to person, place, time/situation and in no apparent distress.

## 2017-06-28 NOTE — ED ADULT NURSE NOTE - OBJECTIVE STATEMENT
pt came in ED with c/o hematuria since yesterday, and noted to pass clots few minutes pta. alert and oriented X 4. urinary bladder noted rounded, distended. non-labored respiration noted. lungs clear and equal. abdomen nondistended, nontender. afebrile. family reports he was cleared by DR Walker yesterday, Erickson removed as well. pt s/p TURP.

## 2017-06-28 NOTE — ED PROVIDER NOTE - OBJECTIVE STATEMENT
77 yo  male with painless hematuria since last evening. Patient did have TURP on 6/5/17 in [place] and in fact did have hematuria and retention which required CBI and admission here a few days ago. Erickson was removed 2-days ago and now has hematuria. No fever or chills. No abdominal pains.

## 2017-06-29 DIAGNOSIS — T81.9XXA UNSPECIFIED COMPLICATION OF PROCEDURE, INITIAL ENCOUNTER: ICD-10-CM
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
## 2017-07-06 DIAGNOSIS — N99.840 POSTPROCEDURAL HEMATOMA OF A GENITOURINARY SYSTEM ORGAN OR STRUCTURE FOLLOWING A GENITOURINARY SYSTEM PROCEDURE: ICD-10-CM

## 2017-07-06 DIAGNOSIS — N99.89 OTHER POSTPROCEDURAL COMPLICATIONS AND DISORDERS OF GENITOURINARY SYSTEM: ICD-10-CM
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
## 2018-02-08 NOTE — PROGRESS NOTE ADULT - PROBLEM SELECTOR PLAN 3
Encounter Date: 2/7/2018       History     Chief Complaint   Patient presents with    Neck Pain     pt reports fell 3 ft out of bed and now has neck pain     Gordon Griffin III is a 36 y.o. Male presenting for evaluation of right-sided neck pain and tingling in his right arm since falling out of bed just prior to arrival.  Patient states that he attempted to roll over in bed, lost his balance and fell out onto his head and right shoulder.  He did not lose consciousness.  He has not had any episodes of vomiting.  Patient is concerned that he may have a concussion.  No changes to his vision.  He has not taken any medication for his symptoms.  He does currently have a neurologist.      The history is provided by the patient.     Review of patient's allergies indicates:   Allergen Reactions    Mustard Itching, Nausea And Vomiting, Shortness Of Breath and Swelling    Mushroom Itching, Nausea And Vomiting and Swelling    Niaspan extended-release [niacin] Itching    Olive extract Itching, Nausea And Vomiting and Swelling    Extendryl [[id number]] Rash    V-cillin k Rash     Past Medical History:   Diagnosis Date    Anxiety     Chest pain 1/20/2016 12/30/2015: Began experinece chest pain.    Depression     Migraine headache     Stroke pt. states he had a cva at 3 months old     Past Surgical History:   Procedure Laterality Date    MANDIBLE SURGERY      reconstruction     Family History   Problem Relation Age of Onset    Heart disease Father     Heart disease Paternal Uncle      Social History   Substance Use Topics    Smoking status: Never Smoker    Smokeless tobacco: Never Used    Alcohol use No     Review of Systems   Constitutional: Negative for chills and fever.   HENT: Negative for facial swelling and trouble swallowing.    Eyes: Negative for discharge.   Respiratory: Negative for cough, chest tightness, shortness of breath and wheezing.    Cardiovascular: Negative for  chest pain and palpitations.   Gastrointestinal: Negative for abdominal pain, diarrhea, nausea and vomiting.   Genitourinary: Negative for dysuria and hematuria.   Musculoskeletal: Positive for arthralgias, myalgias and neck pain. Negative for back pain, joint swelling and neck stiffness.   Skin: Negative for color change, pallor, rash and wound.   Neurological: Negative for dizziness, syncope, weakness, light-headedness, numbness and headaches.   Hematological: Does not bruise/bleed easily.   Psychiatric/Behavioral: The patient is not nervous/anxious.        Physical Exam     Initial Vitals [02/07/18 2241]   BP Pulse Resp Temp SpO2   (!) 170/93 90 16 98.6 °F (37 °C) 97 %      MAP       118.67         Physical Exam    Nursing note and vitals reviewed.  Constitutional: He appears well-developed and well-nourished. He is not diaphoretic. No distress.   HENT:   Head: Normocephalic and atraumatic.   Right Ear: External ear normal.   Left Ear: External ear normal.   Nose: Nose normal.   Mouth/Throat: Oropharynx is clear and moist.   Eyes: Conjunctivae and EOM are normal. Pupils are equal, round, and reactive to light.   Neck: Normal range of motion. Neck supple.   Cardiovascular: Normal rate, regular rhythm, normal heart sounds and intact distal pulses.   No murmur heard.  Pulmonary/Chest: Breath sounds normal. No respiratory distress. He has no wheezes. He has no rhonchi. He has no rales.   Abdominal: Soft. He exhibits no distension and no mass. There is no tenderness.   Musculoskeletal: Normal range of motion. He exhibits tenderness. He exhibits no edema.   Mild tenderness palpation noted to right cervical paraspinal muscles, extending into the right trapezius.  No midline cervical spinous process tenderness noted.  No decreased range of motion, decreased strength or loss of sensation to bilateral upper or lower extremities.  Palpable 2+ radial and pedal pulses.   Lymphadenopathy:     He has no cervical adenopathy.    Neurological: He is alert and oriented to person, place, and time. He has normal strength. No cranial nerve deficit or sensory deficit.   No focal neurological deficits noted.  Cranial nerves III-XII grossly intact.  Equal, rapid alternating movements noted to bilateral upper and lower extremities.      Skin: Skin is warm and dry. No rash and no abscess noted. No erythema.   Psychiatric: He has a normal mood and affect.         ED Course   Procedures  Labs Reviewed - No data to display          Medical Decision Making:   Differential Diagnosis:   Fracture  Dislocation  Sprain  Contusion  Strain  Spasm  Concussion       APC / Resident Notes:   Low suspicion for acute intracranial process and no need for further imaging or testing at this time.  Low suspicion for cervical spine fracture; however, he has likely experienced a cervical strain with associated radiculopathy.  He is given a dose of IM Toradol and Norflex here in the emergency department.  He is discharged home with a prescription for Motrin, Flexeril and Zofran.  He may begin to experience some postconcussive symptoms over the next couple of days and is encouraged follow-up with his established neurologist.  No need for further imaging or testing.  He voices understanding and is agreeable to the plan.  He is given specific return precautions.              ED Course      Clinical Impression:   The primary encounter diagnosis was Cervical strain, acute, initial encounter. Diagnoses of Cervical radiculopathy and Post concussive syndrome were also pertinent to this visit.                           Zainab Oliva PA-C  02/08/18 0216     prediabetes  HbA1C 5.7  f/u with pmd on discharge for further management and care  advise diet and exercise as tolerated

## 2020-05-26 NOTE — PRE-OP CHECKLIST - TYPE OF SOLUTION
Include Z78.9 (Other Specified Conditions Influencing Health Status) As An Associated Diagnosis?: No lr

## 2021-06-13 NOTE — PRE-OP CHECKLIST - ISOLATION PRECAUTIONS
RN to call patient and fax Home Nurse.  New Rx: Seroquel 12.5 mg up to twice daily as needed anxiety.  
droplet/none

## 2022-06-07 NOTE — ED ADULT NURSE NOTE - ISOLATION TYPE:
None Solaraze Pregnancy And Lactation Text: This medication is Pregnancy Category B and is considered safe. There is some data to suggest avoiding during the third trimester. It is unknown if this medication is excreted in breast milk.

## 2024-10-02 NOTE — BRIEF OPERATIVE NOTE - ELECTIVE PROCEDURE
October 2, 2024    [facility] Client Coordination Note Report of 09/28/2024 was picked up from outbox of Dr. Lund and sent via fax to [phone].    Helen Bowen     No

## 2025-02-26 NOTE — ED PROVIDER NOTE - NS ED MD DISPO DISCHARGE
ibuprofen 600 mg every 6 hours as needed for pain or Tylenol 650 mg every 4 hours for extreme pain medications can be taken together.  Cold compresses 20 minutes at a time 4 times a day x 3 days heat compress  there after 20mins at a time   muscle relaxants as needed may cause drowsiness, no deriving or operating heavy machinery  Please follow-up with your primary doctor return to the ED for any new or worsening symptoms    Home

## 2025-06-19 NOTE — ED ADULT NURSE NOTE - ATTEMPT TO OOB
Verified patient with two types of identifiers. Mr. Fregoso took Lasix for a week and tolerated this well. He is back to every other day and walked a mile yesterday. Confirmed follow ups.  
no